# Patient Record
Sex: FEMALE | Race: WHITE | NOT HISPANIC OR LATINO | Employment: STUDENT | URBAN - METROPOLITAN AREA
[De-identification: names, ages, dates, MRNs, and addresses within clinical notes are randomized per-mention and may not be internally consistent; named-entity substitution may affect disease eponyms.]

---

## 2023-10-09 DIAGNOSIS — L70.9 ACNE, UNSPECIFIED: ICD-10-CM

## 2023-10-10 RX ORDER — ADAPALENE AND BENZOYL PEROXIDE GEL, 0.1%/2.5% 1; 25 MG/G; MG/G
GEL TOPICAL
Qty: 45 G | Refills: 2 | Status: SHIPPED | OUTPATIENT
Start: 2023-10-10 | End: 2023-12-11 | Stop reason: WASHOUT

## 2023-12-11 ENCOUNTER — OFFICE VISIT (OUTPATIENT)
Dept: PEDIATRICS | Facility: CLINIC | Age: 18
End: 2023-12-11
Payer: COMMERCIAL

## 2023-12-11 VITALS — BODY MASS INDEX: 20.24 KG/M2 | WEIGHT: 123.5 LBS | TEMPERATURE: 98.1 F

## 2023-12-11 DIAGNOSIS — R80.9 PROTEINURIA, UNSPECIFIED TYPE: ICD-10-CM

## 2023-12-11 DIAGNOSIS — R30.0 DYSURIA: Primary | ICD-10-CM

## 2023-12-11 PROBLEM — L70.9 ACNE: Status: ACTIVE | Noted: 2023-12-11

## 2023-12-11 PROBLEM — J35.1 ENLARGED TONSILS: Status: RESOLVED | Noted: 2023-12-11 | Resolved: 2023-12-11

## 2023-12-11 PROBLEM — L71.0 PERIORAL DERMATITIS: Status: RESOLVED | Noted: 2023-12-11 | Resolved: 2023-12-11

## 2023-12-11 PROBLEM — M25.561 ACUTE PAIN OF RIGHT KNEE: Status: RESOLVED | Noted: 2022-04-11 | Resolved: 2023-12-11

## 2023-12-11 PROBLEM — J03.90 TONSILLITIS: Status: RESOLVED | Noted: 2023-12-11 | Resolved: 2023-12-11

## 2023-12-11 PROBLEM — E61.1 LOW SERUM IRON: Status: ACTIVE | Noted: 2023-12-11

## 2023-12-11 PROBLEM — R79.89 ABNORMAL COMPLETE BLOOD COUNT: Status: ACTIVE | Noted: 2023-12-11

## 2023-12-11 LAB
POC APPEARANCE, URINE: CLEAR
POC BILIRUBIN, URINE: NEGATIVE
POC BLOOD, URINE: NEGATIVE
POC COLOR, URINE: YELLOW
POC GLUCOSE, URINE: NEGATIVE MG/DL
POC KETONES, URINE: NEGATIVE MG/DL
POC LEUKOCYTES, URINE: NEGATIVE
POC NITRITE,URINE: NEGATIVE
POC PH, URINE: 7 PH
POC PROTEIN, URINE: ABNORMAL MG/DL
POC SPECIFIC GRAVITY, URINE: 1.02
POC UROBILINOGEN, URINE: 0.2 EU/DL

## 2023-12-11 PROCEDURE — 82570 ASSAY OF URINE CREATININE: CPT

## 2023-12-11 PROCEDURE — 99213 OFFICE O/P EST LOW 20 MIN: CPT | Performed by: PEDIATRICS

## 2023-12-11 PROCEDURE — 1036F TOBACCO NON-USER: CPT | Performed by: PEDIATRICS

## 2023-12-11 PROCEDURE — 84156 ASSAY OF PROTEIN URINE: CPT

## 2023-12-11 PROCEDURE — 87800 DETECT AGNT MULT DNA DIREC: CPT

## 2023-12-11 PROCEDURE — 81003 URINALYSIS AUTO W/O SCOPE: CPT | Performed by: PEDIATRICS

## 2023-12-11 RX ORDER — ADAPALENE AND BENZOYL PEROXIDE GEL, 0.1%/2.5% 1; 25 MG/G; MG/G
GEL TOPICAL
COMMUNITY
Start: 2021-01-13

## 2023-12-11 RX ORDER — CLINDAMYCIN PHOSPHATE 10 UG/ML
LOTION TOPICAL
COMMUNITY
Start: 2023-06-27

## 2023-12-11 ASSESSMENT — ENCOUNTER SYMPTOMS: DYSURIA: 1

## 2023-12-11 NOTE — PATIENT INSTRUCTIONS
Put 1/2 cup baking soda in bath tub of warm water and soak for 10-15 minutes as needed.  You may apply Vaseline or Aquaphor to help soothe the skin.    Bring in a first morning urine when you are able.  I will call with other lab results when available.

## 2023-12-11 NOTE — PROGRESS NOTES
Subjective   Patient ID: Maren Thornton is a 18 y.o. female otherwise healthy who presents for Difficulty Urinating (Discomfort at the urethral opening, present for 1 week. ).      HPI:  Maren presents with discomfort around her urethra for the past week.  Symptoms started with urgency and burning,, but she tried to drink more fluids and these symptoms stopped.  No hematuria, fever, or chills.  No vaginal discharge.  She is sexually active with one male partner and consistently uses condoms.  LMP was 11/25.    She has been using bubble bath, but this is not new for her.   She did exercise just prior to arrival at the office.   No abdominal pain, vomiting, or diarrhea.  No sore throat or URI symptoms.         Difficulty Urinating             Objective   Temp 36.7 °C (98.1 °F) (Temporal)   Wt 56 kg (123 lb 8 oz)   BMI 20.24 kg/m²   BSA: 1.61 meters squared  Growth percentiles: Facility age limit for growth %john is 20 years. Facility age limit for growth %john is 20 years.     Physical Exam  Vitals reviewed.   Constitutional:       Appearance: Normal appearance.   HENT:      Nose: Nose normal.      Mouth/Throat:      Mouth: Mucous membranes are moist.      Pharynx: Oropharynx is clear.   Eyes:      Conjunctiva/sclera: Conjunctivae normal.   Cardiovascular:      Rate and Rhythm: Normal rate and regular rhythm.      Heart sounds: Normal heart sounds.   Pulmonary:      Effort: Pulmonary effort is normal.      Breath sounds: Normal breath sounds.   Abdominal:      General: Abdomen is flat.      Palpations: Abdomen is soft.   Genitourinary:     General: Normal vulva.      Vagina: No vaginal discharge.   Musculoskeletal:      Cervical back: Neck supple.   Neurological:      Mental Status: She is alert.       Assessment/Plan   Diagnoses and all orders for this visit:  Dysuria  -     POCT UA Automated manually resulted  -     C. Trachomatis / N. Gonorrhoeae, Amplified Detection  Proteinuria, unspecified type  -      Protein, Urine Random  -     Creatinine, Urine Random  Will collect a first morning urine to check for protein, while awaiting other lab results.   Discussed trying baking soda sitz baths, vaseline or Aquaphor for soothing skin, and avoiding bubble baths.

## 2023-12-12 DIAGNOSIS — R80.2 ORTHOSTATIC PROTEINURIA: Primary | ICD-10-CM

## 2023-12-12 LAB
C TRACH RRNA SPEC QL NAA+PROBE: NEGATIVE
CREAT UR-MCNC: 170.3 MG/DL (ref 20–320)
CREAT UR-MCNC: 170.3 MG/DL (ref 20–320)
N GONORRHOEA DNA SPEC QL PROBE+SIG AMP: NEGATIVE
POC APPEARANCE, URINE: CLEAR
POC BILIRUBIN, URINE: NEGATIVE
POC BLOOD, URINE: NEGATIVE
POC COLOR, URINE: YELLOW
POC GLUCOSE, URINE: NEGATIVE MG/DL
POC KETONES, URINE: NEGATIVE MG/DL
POC LEUKOCYTES, URINE: NEGATIVE
POC NITRITE,URINE: NEGATIVE
POC PH, URINE: 5.5 PH
POC PROTEIN, URINE: NEGATIVE MG/DL
POC SPECIFIC GRAVITY, URINE: >=1.03
POC UROBILINOGEN, URINE: 0.2 EU/DL
PROT UR-ACNC: 115 MG/DL (ref 5–24)
PROT/CREAT UR: 0.68 MG/MG CREAT (ref 0–0.17)

## 2023-12-12 PROCEDURE — 81003 URINALYSIS AUTO W/O SCOPE: CPT | Performed by: PEDIATRICS

## 2024-07-23 DIAGNOSIS — L70.0 ACNE VULGARIS: Primary | ICD-10-CM

## 2024-07-23 RX ORDER — ADAPALENE AND BENZOYL PEROXIDE GEL, 0.1%/2.5% 1; 25 MG/G; MG/G
GEL TOPICAL
Qty: 45 G | Refills: 0 | Status: SHIPPED | OUTPATIENT
Start: 2024-07-23